# Patient Record
Sex: MALE | Race: BLACK OR AFRICAN AMERICAN | URBAN - NONMETROPOLITAN AREA
[De-identification: names, ages, dates, MRNs, and addresses within clinical notes are randomized per-mention and may not be internally consistent; named-entity substitution may affect disease eponyms.]

---

## 2017-09-12 ENCOUNTER — TELEPHONE (OUTPATIENT)
Dept: ADMINISTRATIVE | Age: 82
End: 2017-09-12

## 2017-11-03 ENCOUNTER — APPOINTMENT (OUTPATIENT)
Dept: CT IMAGING | Age: 82
DRG: 065 | End: 2017-11-03
Payer: MEDICARE

## 2017-11-03 ENCOUNTER — HOSPITAL ENCOUNTER (INPATIENT)
Age: 82
LOS: 2 days | Discharge: NURSING, REHAB OR LONG-TERM CARE | DRG: 065 | End: 2017-11-06
Attending: FAMILY MEDICINE | Admitting: INTERNAL MEDICINE
Payer: MEDICARE

## 2017-11-03 DIAGNOSIS — R47.01 APHASIA: Primary | ICD-10-CM

## 2017-11-03 LAB
ALBUMIN SERPL-MCNC: 3.4 G/DL (ref 3.5–5.1)
ALP BLD-CCNC: 83 U/L (ref 38–126)
ALT SERPL-CCNC: 10 U/L (ref 11–66)
ANION GAP SERPL CALCULATED.3IONS-SCNC: 10 MEQ/L (ref 8–16)
AST SERPL-CCNC: 13 U/L (ref 5–40)
BASOPHILS # BLD: 0.4 %
BASOPHILS ABSOLUTE: 0 THOU/MM3 (ref 0–0.1)
BILIRUB SERPL-MCNC: 0.5 MG/DL (ref 0.3–1.2)
BUN BLDV-MCNC: 29 MG/DL (ref 7–22)
CALCIUM SERPL-MCNC: 8.4 MG/DL (ref 8.5–10.5)
CHLORIDE BLD-SCNC: 103 MEQ/L (ref 98–111)
CO2: 26 MEQ/L (ref 23–33)
CREAT SERPL-MCNC: 1.6 MG/DL (ref 0.4–1.2)
EOSINOPHIL # BLD: 2 %
EOSINOPHILS ABSOLUTE: 0.1 THOU/MM3 (ref 0–0.4)
GFR SERPL CREATININE-BSD FRML MDRD: 49 ML/MIN/1.73M2
GLUCOSE BLD-MCNC: 106 MG/DL (ref 70–108)
GLUCOSE BLD-MCNC: 133 MG/DL (ref 70–108)
HCT VFR BLD CALC: 36 % (ref 42–52)
HEMOGLOBIN: 11.9 GM/DL (ref 14–18)
LACTIC ACID: 1.3 MMOL/L (ref 0.5–2.2)
LYMPHOCYTES # BLD: 36.6 %
LYMPHOCYTES ABSOLUTE: 2.6 THOU/MM3 (ref 1–4.8)
MCH RBC QN AUTO: 32 PG (ref 27–31)
MCHC RBC AUTO-ENTMCNC: 33.1 GM/DL (ref 33–37)
MCV RBC AUTO: 96.8 FL (ref 80–94)
MONOCYTES # BLD: 8.1 %
MONOCYTES ABSOLUTE: 0.6 THOU/MM3 (ref 0.4–1.3)
NUCLEATED RED BLOOD CELLS: 0 /100 WBC
OSMOLALITY CALCULATION: 285.3 MOSMOL/KG (ref 275–300)
PDW BLD-RTO: 13.5 % (ref 11.5–14.5)
PLATELET # BLD: 119 THOU/MM3 (ref 130–400)
PMV BLD AUTO: 8.9 MCM (ref 7.4–10.4)
POTASSIUM SERPL-SCNC: 4.9 MEQ/L (ref 3.5–5.2)
RBC # BLD: 3.72 MILL/MM3 (ref 4.7–6.1)
SEG NEUTROPHILS: 52.9 %
SEGMENTED NEUTROPHILS ABSOLUTE COUNT: 3.7 THOU/MM3 (ref 1.8–7.7)
SODIUM BLD-SCNC: 139 MEQ/L (ref 135–145)
TOTAL PROTEIN: 6.7 G/DL (ref 6.1–8)
TROPONIN T: 0.03 NG/ML
WBC # BLD: 7 THOU/MM3 (ref 4.8–10.8)

## 2017-11-03 PROCEDURE — 70496 CT ANGIOGRAPHY HEAD: CPT

## 2017-11-03 PROCEDURE — 84484 ASSAY OF TROPONIN QUANT: CPT

## 2017-11-03 PROCEDURE — 93005 ELECTROCARDIOGRAM TRACING: CPT

## 2017-11-03 PROCEDURE — B32G1ZZ COMPUTERIZED TOMOGRAPHY (CT SCAN) OF BILATERAL VERTEBRAL ARTERIES USING LOW OSMOLAR CONTRAST: ICD-10-PCS | Performed by: RADIOLOGY

## 2017-11-03 PROCEDURE — 70498 CT ANGIOGRAPHY NECK: CPT

## 2017-11-03 PROCEDURE — 83605 ASSAY OF LACTIC ACID: CPT

## 2017-11-03 PROCEDURE — B32R1ZZ COMPUTERIZED TOMOGRAPHY (CT SCAN) OF INTRACRANIAL ARTERIES USING LOW OSMOLAR CONTRAST: ICD-10-PCS | Performed by: RADIOLOGY

## 2017-11-03 PROCEDURE — 80053 COMPREHEN METABOLIC PANEL: CPT

## 2017-11-03 PROCEDURE — 99285 EMERGENCY DEPT VISIT HI MDM: CPT

## 2017-11-03 PROCEDURE — 82948 REAGENT STRIP/BLOOD GLUCOSE: CPT

## 2017-11-03 PROCEDURE — B3251ZZ COMPUTERIZED TOMOGRAPHY (CT SCAN) OF BILATERAL COMMON CAROTID ARTERIES USING LOW OSMOLAR CONTRAST: ICD-10-PCS | Performed by: RADIOLOGY

## 2017-11-03 PROCEDURE — 6370000000 HC RX 637 (ALT 250 FOR IP): Performed by: INTERNAL MEDICINE

## 2017-11-03 PROCEDURE — 36415 COLL VENOUS BLD VENIPUNCTURE: CPT

## 2017-11-03 PROCEDURE — 6360000004 HC RX CONTRAST MEDICATION: Performed by: INTERNAL MEDICINE

## 2017-11-03 PROCEDURE — 70450 CT HEAD/BRAIN W/O DYE: CPT

## 2017-11-03 PROCEDURE — B3281ZZ COMPUTERIZED TOMOGRAPHY (CT SCAN) OF BILATERAL INTERNAL CAROTID ARTERIES USING LOW OSMOLAR CONTRAST: ICD-10-PCS | Performed by: RADIOLOGY

## 2017-11-03 PROCEDURE — 85025 COMPLETE CBC W/AUTO DIFF WBC: CPT

## 2017-11-03 RX ORDER — M-VIT,TX,IRON,MINS/CALC/FOLIC 27MG-0.4MG
1 TABLET ORAL DAILY
COMMUNITY

## 2017-11-03 RX ORDER — ASPIRIN 81 MG/1
81 TABLET, CHEWABLE ORAL ONCE
Status: COMPLETED | OUTPATIENT
Start: 2017-11-03 | End: 2017-11-03

## 2017-11-03 RX ADMIN — ASPIRIN 81 MG 81 MG: 81 TABLET ORAL at 21:45

## 2017-11-03 RX ADMIN — IOPAMIDOL 80 ML: 755 INJECTION, SOLUTION INTRAVENOUS at 20:27

## 2017-11-03 ASSESSMENT — ENCOUNTER SYMPTOMS
NAUSEA: 0
RHINORRHEA: 0
DIARRHEA: 0
WHEEZING: 0
SORE THROAT: 0
EYE REDNESS: 0
COUGH: 0
BACK PAIN: 0
VOMITING: 0
ABDOMINAL PAIN: 0
SHORTNESS OF BREATH: 0
EYE DISCHARGE: 0

## 2017-11-03 NOTE — ED PROVIDER NOTES
[facility]  eMERGENCY dEPARTMENT eNCOUnter          CHIEF COMPLAINT       Chief Complaint   Patient presents with    Aphasia       Nurses Notes reviewed and I agree except as noted in the HPI. HISTORY OF PRESENT ILLNESS    Elena Lucas is a 80 y.o. male who presents to the Emergency Department via EMS for the evaluation of aphasia. The nursing home states that the patient was his normally talkative self all day until 7PM. They states that the patient stopped talking and had some left side facial droop. The patient has medical history of dementia and and hypertension. Unable to fully access the patient due to the acuity of his condition and limited history. REVIEW OF SYSTEMS     Review of Systems   Constitutional: Negative for appetite change, chills, fatigue and fever. HENT: Negative for congestion, ear pain, rhinorrhea and sore throat. Eyes: Negative for discharge, redness and visual disturbance. Respiratory: Negative for cough, shortness of breath and wheezing. Cardiovascular: Negative for chest pain, palpitations and leg swelling. Gastrointestinal: Negative for abdominal pain, diarrhea, nausea and vomiting. Genitourinary: Negative for decreased urine volume, difficulty urinating and dysuria. Musculoskeletal: Negative for arthralgias, back pain, joint swelling and neck pain. Skin: Negative for pallor and rash. Allergic/Immunologic: Negative for environmental allergies. Neurological: Positive for facial asymmetry (left side facial droop ) and speech difficulty (asphagia ). Negative for dizziness, syncope, weakness, light-headedness and headaches. Hematological: Negative for adenopathy. Psychiatric/Behavioral: Negative for agitation, confusion, dysphoric mood and suicidal ideas. The patient is not nervous/anxious. PAST MEDICAL HISTORY    has a past medical history of Abnormal posture; Alzheimer disease;  Atherosclerotic heart disease; Benign prostatic hyperplasia; Contracture of elbow; Contracture of hand joint; Contracture of shoulder; Dementia; Dementia; Diabetes mellitus ([facility] [address] Dysphagia; Falling; Hydrocephalus; Hypertension; Lack of coordination; and Muscle weakness. SURGICAL HISTORY      has no past surgical history on file. CURRENT MEDICATIONS       Previous Medications    ACETAMINOPHEN (TYLENOL) 325 MG TABLET    Take 650 mg by mouth every 4 hours as needed for Pain    AMOXICILLIN-CLAVULANATE (AUGMENTIN) 875-125 MG PER TABLET    Take 1 tablet by mouth 2 times daily For 10 days    BABY ASPIRIN PO    Take 81 mg by mouth daily. DONEPEZIL (ARICEPT) 10 MG TABLET      Take 10 mg by mouth daily     ERYTHROMYCIN BASE (E-MYCIN) 250 MG TABLET    Take 250 mg by mouth daily For 4 days    MEMANTINE (NAMENDA XR) 28 MG CP24 CAPSULE    Take 28 mg by mouth daily    NITROGLYCERIN (NITROSTAT) 0.4 MG SL TABLET    Place 0.4 mg under the tongue every 5 minutes as needed for Chest pain Up to 3 doses    TAMSULOSIN (FLOMAX) 0.4 MG CAPSULE    Take 0.4 mg by mouth daily       ALLERGIES     has No Known Allergies. FAMILY HISTORY     has no family status information on file. family history is not on file. SOCIAL HISTORY      reports that he has quit smoking. His smoking use included Cigarettes. He does not have any smokeless tobacco history on file. He reports that he does not drink alcohol or use drugs. PHYSICAL EXAM     INITIAL VITALS:  blood pressure is 160/72 (abnormal) and his pulse is 62. His respiration is 16 and oxygen saturation is 100%. Physical Exam   Constitutional: He is oriented to person, place, and time. He appears well-developed and well-nourished. The patient understands some directions and commands, unable to talk. HENT:   Head: Normocephalic and atraumatic. The patient has a left side facial droop. Eyes: Conjunctivae are normal. Right eye exhibits no discharge. Left eye exhibits no discharge. No scleral icterus.    The patient has pin point pupils are bilaterally. Neck: Normal range of motion. Neck supple. No JVD present. Cardiovascular: Normal rate and normal heart sounds. Pulmonary/Chest: Effort normal. No stridor. No respiratory distress. Abdominal: Soft. He exhibits no distension. Musculoskeletal: Normal range of motion. He exhibits no edema. The patient has bilateral contracture to upper extremities. Neurological: He is alert and oriented to person, place, and time. He exhibits normal muscle tone. GCS eye subscore is 4. GCS verbal subscore is 5. GCS motor subscore is 6. Skin: Skin is warm and dry. He is not diaphoretic. No erythema. Psychiatric: He has a normal mood and affect. His behavior is normal.   Nursing note and vitals reviewed. DIAGNOSTIC RESULTS     EKG: All EKG's are interpreted by the Emergency Department Physician who either signs or Co-signs this chart in the absence of a cardiologist.  EKG interpreted by Sin Jaramillo MD:    Vent. Rate: 66 bpm  IL interval: 192 ms  QRS duration: 86 ms  QTc: 444 ms  P-R-T axes: 55, -32, -23  Sinus rhythm with occasional premature ventricular complexes  Left axis deviation  Voltage criteria for left ventricular hypertrophy  Inferior infarct, age undetermined  No STEMI    RADIOLOGY: non-plain film images(s) such as CT, Ultrasound and MRI are read by the radiologist.    CTA Head W WO Contrast   Final Result      1. 75 % stenosis at the origin of the right internal carotid artery. 2. No stenosis of the left carotid system. 3. Mild intracranial atherosclerosis without severe stenoses. 4. Severe stenosis at the origin of the right vertebral artery. There is moderate stenosis of the origin of the left vertebral artery. There is moderate stenosis of both vertebral arteries as they enter the dural ring. **This report has been created using voice recognition software. It may contain minor errors which are inherent in voice recognition technology. ** Final report electronically signed by Dr. Nile Ramirez on 11/3/2017 8:59 PM      CTA NECK W WO CONTRAST   Final Result      1. 75 % stenosis at the origin of the right internal carotid artery. 2. No stenosis of the left carotid system. 3. Mild intracranial atherosclerosis without severe stenoses. 4. Severe stenosis at the origin of the right vertebral artery. There is moderate stenosis of the origin of the left vertebral artery. There is moderate stenosis of both vertebral arteries as they enter the dural ring. **This report has been created using voice recognition software. It may contain minor errors which are inherent in voice recognition technology. **      Final report electronically signed by Dr. Nile Ramirez on 11/3/2017 8:59 PM      CT HEAD WO CONTRAST   Final Result       1. Moderate-severe chronic small vessel ischemic changes. 2. No acute findings. **This report has been created using voice recognition software. It may contain minor errors which are inherent in voice recognition technology. **      Final report electronically signed by Dr. Nile Ramirez on 11/3/2017 8:01 PM          LABS:   Labs Reviewed   CBC WITH AUTO DIFFERENTIAL - Abnormal; Notable for the following:        Result Value    RBC 3.72 (*)     Hemoglobin 11.9 (*)     Hematocrit 36.0 (*)     MCV 96.8 (*)     MCH 32.0 (*)     Platelets 562 (*)     All other components within normal limits   URINE CULTURE   COMPREHENSIVE METABOLIC PANEL   LACTIC ACID, PLASMA   TROPONIN   URINALYSIS   POCT GLUCOSE       EMERGENCY DEPARTMENT COURSE:   Vitals:    Vitals:    11/03/17 1958 11/03/17 2058   BP: (!) 132/94 (!) 160/72   Pulse: 73 62   Resp: 16 16   SpO2:  100%       7:53 PM: The patient was seen and evaluated. For stroke     MDM:  Patient had a CT scan done which was negative for any bleed.   Patient CT of head and neck was done which did showed stenosis of right internal carotid artery and severe stenosis of the origin of the right vertebral artery. During his stay in the emergency department and patient started talking. According to Dr. Toby Cheng patient was not a candidate for TPA based on his CTA. Patient will be admitted by Dr. Ilan Petty for further workup and management. On recommendation of the neurologist patient will be started on baby aspirin. CRITICAL CARE:   Due to the immediate potential for life-threatening deterioration due to aphasia , I spent 35 minutes providing critical care. This time is excluding time spent performing procedures. CONSULTS:  Dr. Toby Cheng MD instructed me to wait before proceeding with any further treatment until the CTA returrns  Dr. Rose Harm:  None     FINAL IMPRESSION      1. Aphasia          DISPOSITION/PLAN   Admit    PATIENT REFERRED TO:  No follow-up provider specified. DISCHARGE MEDICATIONS:  New Prescriptions    No medications on file       (Please note that portions of this note were completed with a voice recognition program.  Efforts were made to edit the dictations but occasionally words are mis-transcribed.)    Scribe:  Landon Khan 11/3/17 7:53 PM Scribing for and in the presence of Ren Cabral MD.    Signed by: Landon Khan, [id number] [place] [address], 11/03/17 9:22 PM    Provider:  I personally performed the services described in the documentation, reviewed and edited the documentation which was dictated to the scribe in my presence, and it accurately records my words and actions.     Ren Cabral MD 11/3/17 9:22 PM                          Ren Cabral MD  11/03/17 9726       Ren Cabral MD  11/03/17 0432       Ren Cabral MD  11/03/17 4501

## 2017-11-04 PROBLEM — I72.6 VERTEBRAL ARTERY ANEURYSM (HCC): Status: ACTIVE | Noted: 2017-11-04

## 2017-11-04 PROBLEM — R47.01 RECEPTIVE APHASIA: Status: ACTIVE | Noted: 2017-11-04

## 2017-11-04 PROBLEM — I65.21 STENOSIS OF RIGHT CAROTID ARTERY: Status: ACTIVE | Noted: 2017-11-04

## 2017-11-04 LAB
CHOLESTEROL, TOTAL: 154 MG/DL (ref 100–199)
EKG ATRIAL RATE: 66 BPM
EKG P AXIS: 55 DEGREES
EKG P-R INTERVAL: 192 MS
EKG Q-T INTERVAL: 424 MS
EKG QRS DURATION: 86 MS
EKG QTC CALCULATION (BAZETT): 444 MS
EKG R AXIS: -32 DEGREES
EKG T AXIS: -23 DEGREES
EKG VENTRICULAR RATE: 66 BPM
HDLC SERPL-MCNC: 46 MG/DL
LDL CHOLESTEROL CALCULATED: 91 MG/DL
MRSA SCREEN RT-PCR: NEGATIVE
TRIGL SERPL-MCNC: 83 MG/DL (ref 0–199)

## 2017-11-04 PROCEDURE — 97166 OT EVAL MOD COMPLEX 45 MIN: CPT

## 2017-11-04 PROCEDURE — 1210000002 HC MED SURG R&B - NEUROSCIENCE

## 2017-11-04 PROCEDURE — A4421 OSTOMY SUPPLY MISC: HCPCS

## 2017-11-04 PROCEDURE — 92610 EVALUATE SWALLOWING FUNCTION: CPT

## 2017-11-04 PROCEDURE — 87147 CULTURE TYPE IMMUNOLOGIC: CPT

## 2017-11-04 PROCEDURE — G8987 SELF CARE CURRENT STATUS: HCPCS

## 2017-11-04 PROCEDURE — 87081 CULTURE SCREEN ONLY: CPT

## 2017-11-04 PROCEDURE — 36415 COLL VENOUS BLD VENIPUNCTURE: CPT

## 2017-11-04 PROCEDURE — 80061 LIPID PANEL: CPT

## 2017-11-04 PROCEDURE — 87641 MR-STAPH DNA AMP PROBE: CPT

## 2017-11-04 PROCEDURE — G8988 SELF CARE GOAL STATUS: HCPCS

## 2017-11-04 PROCEDURE — 2580000003 HC RX 258: Performed by: INTERNAL MEDICINE

## 2017-11-04 PROCEDURE — 99223 1ST HOSP IP/OBS HIGH 75: CPT | Performed by: INTERNAL MEDICINE

## 2017-11-04 RX ORDER — LOPERAMIDE HYDROCHLORIDE 2 MG/1
2 CAPSULE ORAL PRN
COMMUNITY

## 2017-11-04 RX ORDER — NITROGLYCERIN 0.4 MG/1
0.4 TABLET SUBLINGUAL EVERY 5 MIN PRN
Status: DISCONTINUED | OUTPATIENT
Start: 2017-11-04 | End: 2017-11-06 | Stop reason: HOSPADM

## 2017-11-04 RX ORDER — M-VIT,TX,IRON,MINS/CALC/FOLIC 27MG-0.4MG
1 TABLET ORAL DAILY
Status: DISCONTINUED | OUTPATIENT
Start: 2017-11-04 | End: 2017-11-06 | Stop reason: HOSPADM

## 2017-11-04 RX ORDER — ONDANSETRON 2 MG/ML
4 INJECTION INTRAMUSCULAR; INTRAVENOUS EVERY 6 HOURS PRN
Status: DISCONTINUED | OUTPATIENT
Start: 2017-11-04 | End: 2017-11-06 | Stop reason: HOSPADM

## 2017-11-04 RX ORDER — IPRATROPIUM BROMIDE AND ALBUTEROL SULFATE 2.5; .5 MG/3ML; MG/3ML
3 SOLUTION RESPIRATORY (INHALATION) EVERY 4 HOURS PRN
COMMUNITY

## 2017-11-04 RX ORDER — TAMSULOSIN HYDROCHLORIDE 0.4 MG/1
0.4 CAPSULE ORAL DAILY
Status: DISCONTINUED | OUTPATIENT
Start: 2017-11-04 | End: 2017-11-06 | Stop reason: HOSPADM

## 2017-11-04 RX ORDER — MORPHINE SULFATE 4 MG/ML
4 INJECTION, SOLUTION INTRAMUSCULAR; INTRAVENOUS
Status: DISCONTINUED | OUTPATIENT
Start: 2017-11-04 | End: 2017-11-04 | Stop reason: DRUGHIGH

## 2017-11-04 RX ORDER — MORPHINE SULFATE 2 MG/ML
1 INJECTION, SOLUTION INTRAMUSCULAR; INTRAVENOUS
Status: DISCONTINUED | OUTPATIENT
Start: 2017-11-04 | End: 2017-11-06 | Stop reason: HOSPADM

## 2017-11-04 RX ORDER — MEMANTINE HYDROCHLORIDE 10 MG/1
10 TABLET ORAL 2 TIMES DAILY
Status: DISCONTINUED | OUTPATIENT
Start: 2017-11-04 | End: 2017-11-06 | Stop reason: HOSPADM

## 2017-11-04 RX ORDER — MEMANTINE HYDROCHLORIDE 28 MG/1
28 CAPSULE, EXTENDED RELEASE ORAL DAILY
Status: DISCONTINUED | OUTPATIENT
Start: 2017-11-04 | End: 2017-11-04 | Stop reason: CLARIF

## 2017-11-04 RX ORDER — DONEPEZIL HYDROCHLORIDE 10 MG/1
10 TABLET, FILM COATED ORAL DAILY
Status: DISCONTINUED | OUTPATIENT
Start: 2017-11-04 | End: 2017-11-06 | Stop reason: HOSPADM

## 2017-11-04 RX ORDER — ACETAMINOPHEN 325 MG/1
650 TABLET ORAL EVERY 4 HOURS PRN
Status: DISCONTINUED | OUTPATIENT
Start: 2017-11-04 | End: 2017-11-06 | Stop reason: HOSPADM

## 2017-11-04 RX ORDER — SODIUM CHLORIDE 9 MG/ML
INJECTION, SOLUTION INTRAVENOUS CONTINUOUS
Status: DISCONTINUED | OUTPATIENT
Start: 2017-11-04 | End: 2017-11-06 | Stop reason: HOSPADM

## 2017-11-04 RX ADMIN — SODIUM CHLORIDE: 9 INJECTION, SOLUTION INTRAVENOUS at 07:15

## 2017-11-04 ASSESSMENT — PAIN SCALES - WONG BAKER: WONGBAKER_NUMERICALRESPONSE: 0

## 2017-11-04 NOTE — FLOWSHEET NOTE
Dr Jenny Velarde called to modify patients code status and to order therapies, labs and MRSA/VRE cultures

## 2017-11-04 NOTE — PROGRESS NOTES
[facility] NEUROSCIENCES 4A  Bedside Swallowing Evaluation      SLP Individual Minutes  Time In: 3327  Time Out: 1325  Minutes: 11  Timed Code Treatment Minutes: 0 Minutes       Date: 2017  Patient Name: Neeta Monroe      CSN: [id number]   : 1925  (80 y.o.)  Gender: male   Referring Physician:  Dr. Orlando Delgado  Diagnosis: Carotic Stenosis Right  Secondary Diagnosis:  Dysphagia   History of Present Illness/Injury: Patient admitted to [facility] with above dx. See physician H&P for full report. ST consulted d/t swallowing concerns reported by RN Kacey Rojas. ST to complete BSE, determine safety of PO diet and determine least restrictive diet, and further develop POC.    Past Medical History:   Diagnosis Date    Abnormal posture     Alzheimer disease     Atherosclerotic heart disease     Benign prostatic hyperplasia     Contracture of elbow     right and left    Contracture of hand joint     right and left    Contracture of shoulder     right and left    Dementia     Dementia     Diabetes mellitus (HCC)     Dysphagia     Falling     Hydrocephalus     normal pressure    Hypertension     Lack of coordination     Muscle weakness     Other acute kidney failure (CODE) (HCC)     BPH       Pain:  0/10    Current Diet: NPO     Respiratory Status: [] Independent [x] Nasal Cannula [] Oxygen Mask      [] Tracheostomy [] Other:     [] Ventilator/Settings:    Behavioral Observation: [x] Alert [] Oriented [] Confused [x] Lethargic      [] Dysarthric [x] Limited Direction Following [] Agitated      [] Other:    ORAL MECHANISM EVALUATION:         Comments:  Facial / Labial []WFL [x] Impaired []DNT Decreased ROM and weakness   Lingual []WFL [x] Impaired []DNT Decreased ROM and weakness   Dentition []WFL [x] Impaired []DNT Edentulous   Velum []WFL [] Impaired [x]DNT    Vocal Quality [x]WFL [] Impaired []DNT    Sensation [x]WFL [] Impaired []DNT    Cough []WFL [] Impaired [x]DNT    Other: []WFL [] Impaired []DNT    Other: []WFL [] Impaired []DNT        PATIENT WAS EVALUATED USING:  Thin, puree, hard solids     ORAL PHASE: [] WFL  [x] Impaired   [] Loss of bolus from lips [x] Impaired AP movement [] Pocketing Left   [] Pocketing Right  [] Lingual Pumping  [] Impaired Mastication   [x] Reduced Bolus Formation [x] Impaired Oral Initiation    [] OTHER:    PHARYNGEAL PHASE: [x] WFL: Pharyngeal phase appears WFLs, but cannot completely rule out pharyngeal phase deficits from a bedside swallow evaluation alone. [] Impaired   [] Delayed Swallow  [] Decreased Hyolaryngeal Elevation [] Audible Swallow   [] Suspected Pharyngeal Residue due to spontaneous multiple swallow. [] OTHER:    SIGNS AND SYMPTOMS OF LARYNGEAL PENETRATION / ASPIRATION:  [x] NO sign/symptoms of aspiration evident with this evaluation, but cannot rule out silent aspiration. [] Throat Clear  [] Immediate Cough [] Delayed Cough [] Wet Vocal Quality  [] Change in Pulmonary Status  [] OTHER:    IMPRESSIONS: Patient presents with moderate oral dysphagia as evidence by the findings outlined above. Patient with limited lingual/labial ROM and overall increased fatigue. Patient completed evaluation with eyes closed; however, patient remained engaged throughout PO trials. Patient completed trial of thin liquids via straw x10 and puree x4, no s/s of aspiration. Patient demonstrated with mildly impaired; however, functional bolus formation and control, timely A-P transit, timely swallow, effective oral clearance, no s/s of aspiration with puree and thin liquids. Patient is edentulous, advanced textures not evaluate at this time d/t poor dentition and poor alertness. Pharyngeal system presents to be [facility]. Cannot fully r/o aspiration at bedside alone. Recommend dysphagia I with thin liquids, upright position, close pulmonary monitoring and direct 1:1 feeding assistance.   Will plan to complete ST evaluation d/t intermittent confusion and decreased ability to follow 2+ step commands. RECOMMENDATIONS:     Modified Barium Swallow: [] Is indicated to further assess    [x] Is NOT indicated at this time; Will recommend as        appropriate. DIET RECOMMENDATIONS:  Dysphagia I with thin liquids     STRATEGIES: [] Strategies pending MBS results. [x] Full upright position  [x] Small bite/sip [] No Straw [] Multiple Swallow  [] Chin tuck [] Head turn [x] Pulmonary monitoring [] Oral care after all meals  [] Supervision  [] Medication in applesauce [x]Direct 1:1 Supervision/Assistance   [] Spoon all liquids [] Alternate solid / liquid [] Limit distractions [x] Monitor for fatigue  [] PMV in place for all po [x] OTHER: PO intake ONLY when alert       EDUCATION:   Learner: [x]Patient [] Significant other [] Son/Daughter [] Parent     [] Other:   Education: [x] Reviewed results and recommendations of this evaluation     [x] Reviewed diet and strategies     [x] Reviewed signs, symptoms and risk of aspiration     [] Demonstrated how to thick liquid appropriately. [x] Reviewed goals and Plan of Care     [] OTHER:   Method: [x] Discussion [x] Demonstration [] Hand-out     [] OTHER:   Evaluation of Education:     [x] Verbalizes understanding [x] Demonstrates with assistance     [] Demonstrates without assistance [x]Needs further instruction     [] No evidence of learning  [x] Family not present    PATIENT GOALS: [x] Pt did not state. Will further assess during treatment. [] Return to the least restricted diet possible     [] Return to previous level of function     [] OTHER:    PLAN / TREATMENT RECOMMENDATIONS:  [x] Speech Therapy evaluation to assess speech, language, cognition and/or voice  [x] Skilled SLP intervention on acute care 3-5 x per week or until goals met and/or pt plateaus in function.   Specific interventions for next session may include: dysphagia     SHORT TERM GOALS:  Short-term Goals  Timeframe for Short-term Goals: 2 weeks   Goal

## 2017-11-04 NOTE — PROGRESS NOTES
Hospitalist Progress Note    Patient:  Karen Wynn Person      Unit/Bed:4A-16/016-A    YOB: 1925    MRN: [id number]       Acct: [de-identified]     PCP: Jose Wheeler MD    Date of Admission: 11/3/2017        Subjective:     Pt is awake, able to talk but appears confused,had to say if he has aphasia or is simply confused. He is able to follow commands    Medications:  Reviewed    Infusion Medications    sodium chloride 75 mL/hr at 11/04/17 0715     Scheduled Medications    donepezil  10 mg Oral Daily    tamsulosin  0.4 mg Oral Daily    therapeutic multivitamin-minerals  1 tablet Oral Daily    [START ON 11/5/2017] influenza virus vaccine  0.5 mL Intramuscular Once    [START ON 11/5/2017] pneumococcal 13-valent conjugate  0.5 mL Intramuscular Once    memantine  10 mg Oral BID     PRN Meds: acetaminophen, nitroGLYCERIN, ondansetron, morphine **OR** [DISCONTINUED] morphine    No intake or output data in the 24 hours ending 11/04/17 1300    Diet:       Exam:  BP (!) 145/78   Pulse 63   Temp 97.8 °F (36.6 °C) (Axillary)   Resp 14   Ht 5' 8\" (1.727 m)   Wt 182 lb 3.2 oz (82.6 kg)   SpO2 97%   BMI 27.70 kg/m²     General appearance: No apparent distress, appears stated age and cooperative. HEENT: Pupils equal, round, and reactive to light. Conjunctivae/corneas clear. Neck: Supple, with full range of motion. No jugular venous distention. Trachea midline. Respiratory:  Normal respiratory effort. Clear to auscultation, bilaterally without Rales/Wheezes/Rhonchi. Cardiovascular: Regular rate and rhythm with normal S1/S2 without murmurs, rubs or gallops. Abdomen: Soft, non-tender, non-distended with normal bowel sounds. Musculoskeletal: No clubbing, cyanosis or edema bilaterally. Contractures of the arms  Skin: Skin color, texture, turgor normal.  No rashes or lesions. Neurologic:  Neurovascularly intact without any focal sensory/motor deficits.  Cranial nerves: II-XII intact, grossly non-focal.  Capillary Refill: Brisk,< 3 seconds   Peripheral Pulses: +2 palpable, equal bilaterally       Labs:   Recent Labs      11/03/17 2049   WBC  7.0   HGB  11.9*   HCT  36.0*   PLT  119*     Recent Labs      11/03/17 2049   NA  139   K  4.9   CL  103   CO2  26   BUN  29*   CREATININE  1.6*   CALCIUM  8.4*     Recent Labs      11/03/17 2049   AST  13   ALT  10*   BILITOT  0.5   ALKPHOS  83     No results for input(s): INR in the last 72 hours. No results for input(s): Lexie Lizet in the last 72 hours. Urinalysis:      Lab Results   Component Value Date    NITRU NEGATIVE 12/12/2011    BACTERIA NONE 12/12/2011    BLOODU SMALL 12/12/2011    SPECGRAV 1.028 12/12/2011       Radiology:  CTA Head W WO Contrast   Final Result      1. 75 % stenosis at the origin of the right internal carotid artery. 2. No stenosis of the left carotid system. 3. Mild intracranial atherosclerosis without severe stenoses. 4. Severe stenosis at the origin of the right vertebral artery. There is moderate stenosis of the origin of the left vertebral artery. There is moderate stenosis of both vertebral arteries as they enter the dural ring. **This report has been created using voice recognition software. It may contain minor errors which are inherent in voice recognition technology. **      Final report electronically signed by Dr. Morenita Bee on 11/3/2017 8:59 PM      CTA NECK W WO CONTRAST   Final Result      1. 75 % stenosis at the origin of the right internal carotid artery. 2. No stenosis of the left carotid system. 3. Mild intracranial atherosclerosis without severe stenoses. 4. Severe stenosis at the origin of the right vertebral artery. There is moderate stenosis of the origin of the left vertebral artery. There is moderate stenosis of both vertebral arteries as they enter the dural ring. **This report has been created using voice recognition software.  It may contain minor errors which are inherent in voice recognition technology. **      Final report electronically signed by Dr. Bob Polanco on 11/3/2017 8:59 PM      CT HEAD WO CONTRAST   Final Result       1. Moderate-severe chronic small vessel ischemic changes. 2. No acute findings. **This report has been created using voice recognition software. It may contain minor errors which are inherent in voice recognition technology. **      Final report electronically signed by Dr. Bob Polanco on 11/3/2017 8:01 PM          Diet:      DVT prophylaxis: [x] Lovenox                                 [] SCDs                                 [] SQ Heparin                                 [] Encourage ambulation           [] Already on Anticoagulation     Disposition:    [x] Home       [] TCU       [] Rehab       [] Psych       [] SNF       [] [place]       [] Other-    Code Status: DNR-CCA    PT/OT Eval Status: ordered    Assessment/Plan:    Anticipated Discharge in : 3 days      1. Suspected acute CVA with ? Aphasia--,possible acute encephalopathy  2. 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 Dementia with behavioral disturbance- on namenda/aricept  5. dysphagia  6. Essential HTN-controlled  7. Contractures of the upper extremity/weakness-chronic  8. Mild troponin elevation in setting of CKD stage 3-- no furthur w/u--c/w medical mgt  9. CKD stage 3-stable  10.  Chronic anemia,likely related to CKD-mild -follow up outpatient      Plan    -Pt is not an appropriate candidate for JASPER stenosis surgery given advanced dementia/age.  -medical mgt for suspected acute stroke c/w -antiplatelets,statin  -Pt was not a  Candidate for tPA on presentation- not given  -Swallow eval  -PT/OT/ST consults  -Neurology consulted  -Will defer any furthur stroke w/u/mgt to neurology    Electronically signed by Timur He MD on 11/4/2017 at 1:00 PM

## 2017-11-04 NOTE — H&P
[address] [id number]                             HISTORY AND PHYSICAL    PATIENT NAME: Stefania Moise                        :         1925  MED REC NO:   [id number]                           ROOM:       0016  ACCOUNT NO:   [de-identified]                           ADMIT DATE:  2017  PROVIDER:     Neelima Catalan MD, [facility]      IDENTIFICATION:  A -BAGV-SWW white male. CHIEF COMPLAINT:  Coming from home with expressive aphasia. HISTORY OF PRESENT ILLNESS:  No available history. The family states  the patient was found to have expressive aphasia suddenly. The  patient has Alzheimer's dementia, contractures, diabetes,  hydrocephalus, lack of coordination, and muscle weakness. It is  becomes difficult to know if these symptoms are real or attached to  the old disease. When he came here he was not speaking much, could not  express himself. Of course, he cannot focus much because of dementia. His answers are in monosyllables. Blood pressure was 132/94, pulse 73  per minute, respirations 16, temperature was 97.8. Pulse oximetry  100% on 4 liters. Was taken to CAT scan, reviewed. It was unclear  how long he had had the expressive dysphasia or if it was any new  weakness in his arms or legs or if there was trouble swallowing or  dysphagia. PAST MEDICAL HISTORY:  Dementia, diabetes, contractures of most of his  joints, atherosclerosis, abnormal posture, falling, hypertension, lack  of coordination. PAST SURGICAL HISTORY:  Nothing significant. ALLERGIES: None. CODE STATUS:  Full code. SOCIAL HISTORY:  Does not smoke or drink. MEDICATIONS:  Multivitamin one daily, Flomax 0.4 mg daily, Namenda 28  mg daily, Aricept 10 mg daily, acetaminophen 650 mg every 4 hours,  nitroglycerin 0.4 b.i.d.     REVIEW OF SYSTEMS:  All 14 systems cannot be reviewed because the  patient does not answer questions very well, but generally I asked him  about chest pain, he said no; shortness of breath, no.  GI:  No abdominal pain. No diarrhea. URINARY SYSTEM:  No dysuria, frequency, or urgency. EYES:  No blurred vision. PHYSICAL EXAMINATION:  GENERAL:  On examination, he is conscious, alert, oriented only to  himself not in place and person or time. VITAL SIGNS:  Blood pressure was 132/94, 146/67; pulse 60 per minute;  respirations 16; temperature 97.8; pulse ox 100% on 1 liter of nasal  cannula oxygen. HEAD AND SCALP:  Atraumatic. EYES:  Pupils are actually pinpoint, 1 mm in size; reaction to light  cannot be discerned with this pinpoint pupils, but he can see. He  seems to follow eye commands with both eyes. Extraocular eye  movements are normal.  EARS:  External auditory canal, eardrums normal.  No perforation. NOSE:  Nasal cavity turbinates, maxillary, frontal, ethmoidal sinuses  nontender. LUNGS:  Chest wall palpation, percussion, auscultation is normal.   Vesicular breath sounds only. HEART:  Palpation, percussion, auscultation is normal.  First and  second heart sounds only heard. ABDOMEN:  Obese, soft, nontender. Bowel sounds normal.  Spleen, liver  and kidney not enlarged. PERIPHERAL VASCULAR SYSTEM:  Radial, ulnar, dorsalis pedis, posterior  tibial, femoral and carotid pulses normal.  SKIN:  No bruises, rashes, ecchymosis, or petechial hemorrhages. ENDOCRINE:  He has diabetes. No thyroid disease. MUSCULOSKELETAL SYSTEM:  Has contractures of elbows, shoulders, and  the hands especially. On examination, the contractures in the hands  limit extension of dorsiflexion or plantar flexion of the hands. Also  the elbows limit some flexion and extension. SKIN:  No bruises, rashes, ecchymosis. EXTREMITIES:  Lower extremities, straight leg raise. He was able to  raise his legs, both lower extremities, about 5 degrees off the bed  without any resistance, that makes it power 3 on both sides.    Dorsiflexion and plantar flexion 3/3. He was able to squeeze both  hands. Power is 3/3 both sides. Face, no facial paralysis. URINARY SYSTEM:   No suprapubic tenderness. No costovertebral angle  tenderness. PERIPHERAL VASCULAR SYSTEM:  Radial, ulnar, dorsalis  pedis, posterior tibial, femoral and carotid pulses normal.    LABORATORY DATA:  Sodium 139, potassium 4.9, chloride 103, CO2 of 26,  BUN 29, creatinine 1.6, down from 32 and 1.9 before, in May. Calcium  8.4, osmolality 285. Liver enzymes all normal.  White cell count 7.0,  hemoglobin 11.9, hematocrit 36, platelets 695, segments 52% or angina  and    DIAGNOSTIC DATA:  X-ray of the chest shows nonspecific alveolar  abnormalities of the left lung base. CTA of the neck shows 75%  stenosis of the right internal carotid artery. No stenosis of the  left side. Mild intracranial atherosclerosis without severe stenosis. Severe stenosis of the origin of the right vertebral artery, moderate  stenosis of the origin of the left vertebral artery, moderate stenosis  of both vertebral arteries as we enter the dural ring. 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 He does not have  any od the weakness or he may have recovered from them. He has  carotid stenosis significant enough to require surgery and  symptomatic. 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 His risk factors include age,  hypertension. The patient  has dementia. His perception of reality at this stage is very  difficult to know and his cooperation after surgery might be difficult  to discern. PLAN:  Consult Neurology. The indications for treatment of carotid  stenosis include symptomatic carotid stenosis above 70% with a  mortality with a stroke rate less than 3% and a stent can be down.    The stent has the advantage of having less vagus nerve

## 2017-11-04 NOTE — ED NOTES
Pt transported to [facility] by cart in stable condition. Pt monitored on telemetry. IV fluids running and IV is patent.     NS     [employer]  11/04/17 0241

## 2017-11-04 NOTE — ED NOTES
Son at bedside and friend. States he has been in the nursing home x 5 years. With bilateral arm contracture at least one year and hasnt walked in 2 years.       Ubaldo Simon RN  11/03/17 2052

## 2017-11-04 NOTE — ED NOTES
Pt to come to CT- for CTA per dr. Arlen Godfrey regardless of creatinine.  Pt transported to CT scan by this nurse     Mandy Parker RN  11/03/17 2023

## 2017-11-04 NOTE — ED TRIAGE NOTES
Pt to ED from [facility] for aphasia beginning 1900 with left sided facial droop. Pt normally speaks appropriately. Pt grunts and moans to questions. Pt alert. Pt unable to show teeth. Bilateral arm contractures that are not new. Pt does not walk either, which is not new.

## 2017-11-04 NOTE — PLAN OF CARE
Problem: Falls - Risk of  Goal: Absence of falls  Outcome: Ongoing  No falls at this time. Patient educated on call light and more frequent rounding completed. Bed alarm on at all times. Problem: Risk for Impaired Skin Integrity  Goal: Tissue integrity - skin and mucous membranes  Structural intactness and normal physiological function of skin and  mucous membranes. Outcome: Ongoing  Patient bedrest due to condition. Patient turned every two hours. No skin issues noted. Problem: NEUROLOGICAL DEFICIT  Goal: Neurological status is stable or improving  Outcome: Ongoing  Patient able to state his name. Patient unable to state the month, place or situation. Due to contractures and weakness, unable to determine if one side is weaker than the other. Pupils pinpoint and non reactive to light. Neuro checks completed every four hours. Problem: HEMODYNAMIC STATUS  Goal: Patient has stable vital signs and fluid balance  Outcome: Ongoing  Vital signs remain within normal limits this shift, at this time. Pt has IV fluids running at 75 mL/hr. All pulses present and palpable. Skin color pink, warm, dry. Capillary refill less then 3 seconds. Problem: ACTIVITY INTOLERANCE/IMPAIRED MOBILITY  Goal: Mobility/activity is maintained at optimum level for patient  Outcome: Ongoing  Patient bedrest due to condition. Patient turned every two hours and as needed. Problem: COMMUNICATION IMPAIRMENT  Goal: Ability to express needs and understand communication  Outcome: Ongoing  Patient has difficulty expressing thoughts and answering questions. Patient able to state name and that is all at this time. Will continue to assess. Problem: NUTRITION  Goal: Nutritional status is improving  Outcome: Ongoing  Patient NPO due to condition. Waiting for speech therapy to see.      Problem: ASPIRATION PRECAUTIONS  Goal: Patient's risk of aspiration is minimized  Outcome: Ongoing  Patient npo at this time due to being on a modified diet and having difficulty prior to admission with swallowing foods. Speech therapy ordered. HOB maintained at 30 degrees. Lungs diminished to ausculation. Problem: Discharge Planning:  Goal: Discharged to appropriate level of care  Discharged to appropriate level of care  Outcome: Ongoing  Discharge planning in process. Neurologist to see patient to determine plan. Problem: Pain:  Goal: Pain level will decrease  Pain level will decrease  Outcome: Ongoing  Patient educated on 1-10 pain scale. Patient unable to verbalize if he is in pain. Patient does not appear to be in pain at this time.      Comments: Patient unable to verbalize understanding of care plan at this time

## 2017-11-04 NOTE — PROGRESS NOTES
Pain:  Pain Assessment  Patient Currently in Pain: Denies  Pain Assessment: 0-10       Social/Functional:  Lives With: Other (comment)  Type of Home:  (ECF)          Receives Help From: Personal care attendant  ADL Assistance: Needs assistance          Ambulation Assistance: Needs assistance  Transfer Assistance: Needs assistance    Active : No       Objective        LUE PROM (degrees)  LUE PROM: Exceptions (<10% ROM, severe contractures present)               RUE PROM (degrees)  RUE PROM: Exceptions (<10% ROM, severe contractures present)       RUE Tone: Hypertonic  LUE Tone: Hypertonic    Movements Are Fluid And Coordinated: No  Coordination and Movement description: Fine motor impairments, Gross motor impairments, Right UE, Left UE               ADL  Grooming: Dependent/Total  UE Bathing: Dependent/Total   Therapist rolled up wash cloths and placed in palm of hands to prevent skin break down and further contractures. Type of ROM/Therapeutic Exercise  Type of ROM/Therapeutic Exercise: PROM  Comment: Pt. tolerated 10 reps of PROM at B elbow flexion/extension, open/close fists, pt presents with contractures and has reduced activity tolerance. Activity Tolerance:  Activity Tolerance: Patient Tolerated treatment well, Patient limited by fatigue    Treatment Initiated:  Nursing gave okay to see pt. Pt aggreable to OT session.      Assessment:  Assessment: Pt. limited in ADLs d/t contractures present in BUE  Performance deficits / Impairments: Decreased ADL status, Decreased ROM, Decreased strength, Decreased fine motor control, Decreased safe awareness, Decreased endurance  Prognosis: Fair  Discharge Recommendations: ECF with OT    Clinical Decision Making: Clinical Decision making was of Moderate Complexity as the result of analysis of data from a detailed assessment, a consideration of several treatment options, the presence of comorbidities affecting the plan of care and the need Modifier : CN

## 2017-11-05 LAB
ANION GAP SERPL CALCULATED.3IONS-SCNC: 14 MEQ/L (ref 8–16)
BASOPHILS # BLD: 0.5 %
BASOPHILS ABSOLUTE: 0 THOU/MM3 (ref 0–0.1)
BUN BLDV-MCNC: 22 MG/DL (ref 7–22)
CALCIUM SERPL-MCNC: 8 MG/DL (ref 8.5–10.5)
CHLORIDE BLD-SCNC: 106 MEQ/L (ref 98–111)
CO2: 21 MEQ/L (ref 23–33)
CREAT SERPL-MCNC: 1.3 MG/DL (ref 0.4–1.2)
EOSINOPHIL # BLD: 0.7 %
EOSINOPHILS ABSOLUTE: 0 THOU/MM3 (ref 0–0.4)
GFR SERPL CREATININE-BSD FRML MDRD: 62 ML/MIN/1.73M2
GLUCOSE BLD-MCNC: 84 MG/DL (ref 70–108)
HCT VFR BLD CALC: 34.7 % (ref 42–52)
HEMOGLOBIN: 11.9 GM/DL (ref 14–18)
LYMPHOCYTES # BLD: 28 %
LYMPHOCYTES ABSOLUTE: 1.5 THOU/MM3 (ref 1–4.8)
MCH RBC QN AUTO: 32.9 PG (ref 27–31)
MCHC RBC AUTO-ENTMCNC: 34.2 GM/DL (ref 33–37)
MCV RBC AUTO: 96.1 FL (ref 80–94)
MONOCYTES # BLD: 10.7 %
MONOCYTES ABSOLUTE: 0.6 THOU/MM3 (ref 0.4–1.3)
NUCLEATED RED BLOOD CELLS: 0 /100 WBC
PDW BLD-RTO: 13 % (ref 11.5–14.5)
PLATELET # BLD: 104 THOU/MM3 (ref 130–400)
PMV BLD AUTO: 8.7 MCM (ref 7.4–10.4)
POTASSIUM SERPL-SCNC: 4.1 MEQ/L (ref 3.5–5.2)
RBC # BLD: 3.61 MILL/MM3 (ref 4.7–6.1)
SEG NEUTROPHILS: 60.1 %
SEGMENTED NEUTROPHILS ABSOLUTE COUNT: 3.2 THOU/MM3 (ref 1.8–7.7)
SODIUM BLD-SCNC: 141 MEQ/L (ref 135–145)
WBC # BLD: 5.4 THOU/MM3 (ref 4.8–10.8)

## 2017-11-05 PROCEDURE — 36415 COLL VENOUS BLD VENIPUNCTURE: CPT

## 2017-11-05 PROCEDURE — 1210000002 HC MED SURG R&B - NEUROSCIENCE

## 2017-11-05 PROCEDURE — 85025 COMPLETE CBC W/AUTO DIFF WBC: CPT

## 2017-11-05 PROCEDURE — 80048 BASIC METABOLIC PNL TOTAL CA: CPT

## 2017-11-05 PROCEDURE — 99232 SBSQ HOSP IP/OBS MODERATE 35: CPT | Performed by: HOSPITALIST

## 2017-11-05 RX ORDER — ASPIRIN 81 MG/1
81 TABLET, CHEWABLE ORAL DAILY
Status: DISCONTINUED | OUTPATIENT
Start: 2017-11-05 | End: 2017-11-06 | Stop reason: HOSPADM

## 2017-11-05 RX ORDER — ATORVASTATIN CALCIUM 10 MG/1
10 TABLET, FILM COATED ORAL NIGHTLY
Status: DISCONTINUED | OUTPATIENT
Start: 2017-11-05 | End: 2017-11-06 | Stop reason: HOSPADM

## 2017-11-05 NOTE — PROGRESS NOTES
Attempted to contact both of patient's emergency contacts regarding MRI screening sheet. Voicemails left for each to call this RN.

## 2017-11-05 NOTE — PROGRESS NOTES
[address] [id number]  PHYSICAL THERAPY MISSED TREATMENT NOTE  ACUTE CARE  [facility]              Missed Treatment  Attempted to see pt this PM and MRI ordered for pt today. Plan to wait for results of MRI. Per RN pt was bed bound at [facility] the past 2 years. RN having difficulty getting a hold of family to approve MRI. Will plan to check back on pt on 11-6-7.     Jagdish Dickinson, DONALD [id number]

## 2017-11-05 NOTE — PLAN OF CARE
Problem: Falls - Risk of  Goal: Absence of falls  Outcome: Ongoing  Patient has been free from falls at this time. Problem: Risk for Impaired Skin Integrity  Goal: Tissue integrity - skin and mucous membranes  Structural intactness and normal physiological function of skin and  mucous membranes. Outcome: Ongoing  Patient's skin tear on scrotum reddened, clean. Barrier cream applied PRN. Problem: NEUROLOGICAL DEFICIT  Goal: Neurological status is stable or improving  Outcome: Ongoing  Patient alert and oriented to person. Problem: HEMODYNAMIC STATUS  Goal: Patient has stable vital signs and fluid balance  Outcome: Ongoing     11/05/17 1315   Vital Signs   Pulse 62   BP (!) 157/71       Problem: ACTIVITY INTOLERANCE/IMPAIRED MOBILITY  Goal: Mobility/activity is maintained at optimum level for patient  Outcome: Ongoing  Patient requires assistance with turns and repositioning. BLE and BUE contractured. Problem: COMMUNICATION IMPAIRMENT  Goal: Ability to express needs and understand communication  Outcome: Ongoing  Patient answers to yes and no questions at times but does not verbalize needs without being prompted. Follows few simple commands. Problem: NUTRITION  Goal: Nutritional status is improving  Outcome: Ongoing  Patient tolerating dysphagia I pureed diet at this time. Problem: ASPIRATION PRECAUTIONS  Goal: Patient's risk of aspiration is minimized  Outcome: Ongoing  No coughing, choking noted with intake at this time. Problem: Discharge Planning:  Goal: Discharged to appropriate level of care  Discharged to appropriate level of care   Outcome: Ongoing  Patient plans to return to [facility] when appropriate for discharge. Problem: Pain:  Goal: Pain level will decrease  Pain level will decrease   Outcome: Ongoing  Patient states, \"No\" when asked if he is having pain.      Problem: Nutrition  Goal: Optimal nutrition therapy  Outcome: Completed Date Met:

## 2017-11-05 NOTE — PROGRESS NOTES
RN called [facility] and spoke with RN who cares for Mr. Vivi Murray on a regular basis. RN stated patient will answer some questions and gets up to a wheelchair.  RN asked about contacting family in which RN stated, \"Yeah, they're very hard to get ahold of.\"

## 2017-11-05 NOTE — PROGRESS NOTES
rashes or lesions. Neurologic:  Neurovascularly intact without any focal sensory/motor deficits. Cranial nerves: II-XII intact, grossly non-focal.  Capillary Refill: Brisk,< 3 seconds   Peripheral Pulses: +2 palpable, equal bilaterally       Labs:   Recent Labs      11/03/17 2049 11/05/17 0407   WBC  7.0  5.4   HGB  11.9*  11.9*   HCT  36.0*  34.7*   PLT  119*  104*     Recent Labs      11/03/17 2049 11/05/17 0407   NA  139  141   K  4.9  4.1   CL  103  106   CO2  26  21*   BUN  29*  22   CREATININE  1.6*  1.3*   CALCIUM  8.4*  8.0*     Recent Labs      11/03/17 2049   AST  13   ALT  10*   BILITOT  0.5   ALKPHOS  83     No results for input(s): INR in the last 72 hours. No results for input(s): Fifi Wong in the last 72 hours. Urinalysis:      Lab Results   Component Value Date    NITRU NEGATIVE 12/12/2011    BACTERIA NONE 12/12/2011    BLOODU SMALL 12/12/2011    SPECGRAV 1.028 12/12/2011       Radiology:  CTA Head W WO Contrast   Final Result      1. 75 % stenosis at the origin of the right internal carotid artery. 2. No stenosis of the left carotid system. 3. Mild intracranial atherosclerosis without severe stenoses. 4. Severe stenosis at the origin of the right vertebral artery. There is moderate stenosis of the origin of the left vertebral artery. There is moderate stenosis of both vertebral arteries as they enter the dural ring. **This report has been created using voice recognition software. It may contain minor errors which are inherent in voice recognition technology. **      Final report electronically signed by Dr. Aurelio Kaplan on 11/3/2017 8:59 PM      CTA NECK W WO CONTRAST   Final Result      1. 75 % stenosis at the origin of the right internal carotid artery. 2. No stenosis of the left carotid system. 3. Mild intracranial atherosclerosis without severe stenoses. 4. Severe stenosis at the origin of the right vertebral artery.  There is moderate stenosis surgery given advanced dementia/age.  -medical mgt for suspected acute stroke c/w -antiplatelets,statin  -Pt was not a  Candidate for tPA on presentation- not given  -Swallow eval  -PT/OT/ST consults  -MRI brain pending  -palliative care consult with possible transition to hospice care        Electronically signed by Ameya Mcgee MD on 11/5/2017 at 2:53 PM

## 2017-11-06 VITALS
SYSTOLIC BLOOD PRESSURE: 168 MMHG | HEIGHT: 68 IN | DIASTOLIC BLOOD PRESSURE: 78 MMHG | BODY MASS INDEX: 25.16 KG/M2 | OXYGEN SATURATION: 96 % | WEIGHT: 166 LBS | RESPIRATION RATE: 16 BRPM | TEMPERATURE: 97.9 F | HEART RATE: 65 BPM

## 2017-11-06 PROBLEM — R47.01 RECEPTIVE APHASIA: Status: RESOLVED | Noted: 2017-11-04 | Resolved: 2017-11-06

## 2017-11-06 PROCEDURE — 92523 SPEECH SOUND LANG COMPREHEN: CPT

## 2017-11-06 PROCEDURE — 2580000003 HC RX 258: Performed by: INTERNAL MEDICINE

## 2017-11-06 PROCEDURE — 99239 HOSP IP/OBS DSCHRG MGMT >30: CPT | Performed by: NURSE PRACTITIONER

## 2017-11-06 RX ORDER — ATORVASTATIN CALCIUM 10 MG/1
10 TABLET, FILM COATED ORAL NIGHTLY
Qty: 30 TABLET | Refills: 3 | DISCHARGE
Start: 2017-11-06

## 2017-11-06 RX ADMIN — SODIUM CHLORIDE: 9 INJECTION, SOLUTION INTRAVENOUS at 12:28

## 2017-11-06 NOTE — DISCHARGE SUMMARY
Hospitalist Discharge Summary    Patient:  Alejandra Manuel  YOB: 1925    MRN: [id number]   Acct: [de-identified]    Primary Care Physician: Jose Wheeler MD    Admit date:  11/3/2017    Discharge date:  11/6/2017      Discharge Diagnoses:   1. Acute right hemisphere CVA--CTA neg for hemorrhage; no MRI; neuro following; no tPA/intervention; aphasia has resolved; unable to determine hemiparesis due to chronic weakness/contractures but able to move all 4 extremities; cont asa, statin; PT/OT/ST  2. Aphasia--related to #1; resolved; not sure of baseline but able to speak and answer simple questions  3. Alzheimer's dementia with behavioral disturbance--oriented to self and follows commands; cont Namenda  4. Dysphagia--related to #1; ST recommend  Pureed diet and thin liquids  5. Right internal carotid stenosis--75% per MRA neck; not candidate for surgical or percutaneous intervention  6. Bilateral vertebral stenosis--mod left and severe right per MRA  7. BPH--continue Flomax  8. Code status--changed to [facility] per family request; palliative care    Discharge Medications:     [facility] [place] Medication Instructions ASP:[id number]    Printed on:11/06/17 4487   Medication Information                      acetaminophen (TYLENOL) 325 MG tablet  Take 650 mg by mouth every 4 hours as needed for Pain or Fever              atorvastatin (LIPITOR) 10 MG tablet  Take 1 tablet by mouth nightly             BABY ASPIRIN PO  Take 81 mg by mouth daily.                donepezil (ARICEPT) 10 MG tablet    Take 10 mg by mouth daily              ipratropium-albuterol (DUONEB) 0.5-2.5 (3) MG/3ML SOLN nebulizer solution  Inhale 3 mLs into the lungs every 4 hours as needed for Shortness of Breath             loperamide (IMODIUM) 2 MG capsule  Take 2 mg by mouth as needed for Diarrhea              memantine (NAMENDA XR) 28 MG CP24 capsule  Take 28 mg by mouth daily             Multiple Vitamins-Minerals (THERAPEUTIC bilaterally. Basilar artery: There is mild irregularity suggestive atherosclerosis. There is no severe stenosis. Superior cerebellar arteries: Normal. Posterior cerebral arteries: Normal. The proximal branches are also normal. No aneurysms or occlusions are noted. The superior sagittal sinus, vein of Servando, internal cerebral veins, straight sinus, transverse sinuses and sigmoid sinuses are patent. Axial source data: The lung apices are clear. There is no upper mediastinal or cervical adenopathy. There are no gross abnormalities in the brain parenchyma. There is some mucosal thickening in the left maxillary sinus. The other paranasal sinuses are normally aerated. There are degenerative changes in the cervical spine. 1. 75 % stenosis at the origin of the right internal carotid artery. 2. No stenosis of the left carotid system. 3. Mild intracranial atherosclerosis without severe stenoses. 4. Severe stenosis at the origin of the right vertebral artery. There is moderate stenosis of the origin of the left vertebral artery. There is moderate stenosis of both vertebral arteries as they enter the dural ring. **This report has been created using voice recognition software. It may contain minor errors which are inherent in voice recognition technology. ** Final report electronically signed by Dr. Sean Marques on 11/3/2017 8:59 PM    Ct Head Wo Contrast    Result Date: 11/3/2017  PROCEDURE: CT HEAD WO CONTRAST CLINICAL INFORMATION: SPEECH CHANGES (OR APHASIA), NEW OR PROGRESSIVE, . Dementia. Speech changes. Aphasia. COMPARISON: Head CT 12/12/2011. TECHNIQUE: Noncontrast 5 mm axial images were obtained through the brain. All CT scans at this facility use dose modulation, iterative reconstruction, and/or weight-based dosing when appropriate to reduce radiation dose to as low as reasonably achievable. FINDINGS: There is global volume loss. There is no hemorrhage. There are no intra-or extra-axial collections.   The ventricles are moderately dilated secondary to volume loss. This is stable. There is a moderate to severe amount of abnormal low attenuation in the white matter of the brain consistent with chronic small vessel ischemic changes. This is unchanged. There is an old lacunar type infarct in the right thalamus. There are vascular calcifications. No new areas of abnormal attenuation are noted. There is some mild mucosal thickening in the left maxillary sinus. There is no suspicious calvarial abnormality. 1. Moderate-severe chronic small vessel ischemic changes. 2. No acute findings. **This report has been created using voice recognition software. It may contain minor errors which are inherent in voice recognition technology. ** Final report electronically signed by Dr. Bob Polanco on 11/3/2017 8:01 PM    Cta Neck W Wo Contrast    Result Date: 11/3/2017  PROCEDURE: CTA HEAD W WO CONTRAST, CTA NECK W WO CONTRAST CLINICAL INFORMATION: cva. Left-sided facial droop. Stopped talking around 7:00 PM. COMPARISON: Head CT 11/3/2017. TECHNIQUE: 1 mm axial images were obtained through the head and neck after the fast bolus administration of contrast. A noncontrast localizer was obtained. 3-D reconstructions were performed on a dedicated 3-D workstation. These include multiplanar MPR images and multiplanar MIP images. Centerline reconstructions were obtained of the carotid systems. Isovue intravenous contrast was given. All CT scans at this facility use dose modulation, iterative reconstruction, and/or weight-based dosing when appropriate to reduce radiation dose to as low as reasonably achievable. FINDINGS: CTA NECK: Aortic arch and branches: The aortic arch is within appropriate limits. The innominate artery and left common carotid artery have normal origins. There is no stenosis at the origin of the left subclavian artery. There is a mild stenosis distal to the origin. There is no stenosis of the right subclavian artery.  There is Axial source data: The lung apices are clear. There is no upper mediastinal or cervical adenopathy. There are no gross abnormalities in the brain parenchyma. There is some mucosal thickening in the left maxillary sinus. The other paranasal sinuses are normally aerated. There are degenerative changes in the cervical spine. 1. 75 % stenosis at the origin of the right internal carotid artery. 2. No stenosis of the left carotid system. 3. Mild intracranial atherosclerosis without severe stenoses. 4. Severe stenosis at the origin of the right vertebral artery. There is moderate stenosis of the origin of the left vertebral artery. There is moderate stenosis of both vertebral arteries as they enter the dural ring. **This report has been created using voice recognition software. It may contain minor errors which are inherent in voice recognition technology. ** Final report electronically signed by Dr. Nile Ramirez on 11/3/2017 8:59 PM    CBC:   Recent Labs      11/05/17 0407   WBC  5.4   HGB  11.9*   PLT  104*     BMP:  Recent Labs      11/05/17 0407   NA  141   K  4.1   CL  106   CO2  21*   BUN  22   CREATININE  1.3*   GLUCOSE  84     Calcium:  Recent Labs      11/05/17 0407   CALCIUM  8.0*     Ionized Calcium:No results for input(s): IONCA in the last 72 hours. Magnesium:No results for input(s): MG in the last 72 hours. Phosphorus:No results for input(s): PHOS in the last 72 hours. BNP:No results for input(s): BNP in the last 72 hours. Glucose:  Recent Labs      11/03/17   1955   POCGLU  106     HgbA1C: No results for input(s): LABA1C in the last 72 hours. INR: No results for input(s): INR in the last 72 hours.   Hepatic: Recent Labs      11/03/17 2049   ALKPHOS  83   ALT  10*   AST  13   PROT  6.7   BILITOT  0.5   LABALBU  3.4*     Amylase and Lipase:  Recent Labs      11/03/17 2049   LACTA  1.3     Lactic Acid:   Recent Labs      11/03/17 2049   LACTA  1.3     Troponin: No results for input(s): CKTOTAL, CKMB, TROPONINI in the last 72 hours. BNP: No results for input(s): BNP in the last 72 hours. Lipids: Recent Labs      11/04/17   0456   CHOL  154   TRIG  83   HDL  46   LDLCALC  91     ABGs: No results found for: PHART, PO2ART, XAO6IUC, ONA1JXA, BEART        PHYSICAL EXAM:  BP (!) 168/78   Pulse 65   Temp 97.9 °F (36.6 °C) (Axillary)   Resp 16   Ht 5' 8\" (1.727 m)   Wt 166 lb (75.3 kg)   SpO2 96%   BMI 25.24 kg/m²   General appearance: No apparent distress, appears stated age and cooperative. HEENT: Pupils equal, round. Yellow crust to eye lids/lashes. Neck: Supple, with full range of motion. No jugular venous distention. Trachea midline. Respiratory:  Normal respiratory effort. Clear to auscultation and diminished bilaterally without Rales/Wheezes/Rhonchi. Cardiovascular: Regular rate and rhythm with normal S1/S2 without murmurs, rubs or gallops. Abdomen: Soft, non-tender, non-distended with normal bowel sounds. Musculoskeletal:  No edema. Minimal movement to extremities x 4. Arms/hands contractures. Bilateral foot drop. Skin: Skin color, texture, turgor normal.  No rashes or lesions. Neurologic:  Neurovascularly intact without any focal sensory/motor deficits. Cranial nerves: II-XII intact, grossly non-focal.  Psychiatric: Alert and oriented to self, follows simple commands and answers \"yes/no\" questions. Capillary Refill: Brisk,< 3 seconds   Peripheral Pulses: +1 palpable, equal bilaterally      Weight: Weight: 166 lb (75.3 kg)     24 hour intake/output:  Intake/Output Summary (Last 24 hours) at 11/06/17 1519  Last data filed at 11/06/17 0414   Gross per 24 hour   Intake          1800.87 ml   Output                0 ml   Net          1800.87 ml                 Hospital Course:  Mr. Elle Scott was admitted on 11/4/2017 after developing expressive aphasia according to his family. He has a history of Alzheimer's dementia and is bedridden with contractures to all 4 extremities.  Neurology was consulted. He was not a candidate for tPA or percutaneous intervention. He was already taking aspirin and a statin. His aphasia resolved spontaneously. Palliative care was consulted and discussed code status. He code status was changed to [facility]. His condition stabilized and he was transferred to [facility] per [facility]. Medication reconciliation was completed. Plan discussed with Dr. Daniel Dominguez.     Disposition: long term care facility    Condition: Stable    Time Spent: 30 to 74 minutes        Electronically signed by Germania Vaughn CNP on 11/6/2017 at 3:19 PM  Discharging Hospitalist: Dr. Daniel Dominguez

## 2017-11-06 NOTE — PROGRESS NOTES
Pharmacy Medication History Note      List of current medications patient is taking is complete. Source of information: [facility]    Changes made to medication list:    Medications removed (include reason, ex. therapy complete or physician discontinued):  · None    Medications added/doses adjusted:   Loperamide changed to prn    Other notes (ex. Recent course of antibiotics, Coumadin dosing):  · Denies use of other OTC or herbal medications. Allergies reviewed      Electronically signed by Se Meredith.  Bryan Stout, [address] [place] [address] on 11/6/2017 at 12:17 PM

## 2017-11-06 NOTE — CARE COORDINATION
Primary RN, Leila Corbin to get Medicare Rights signed by Fadi Pérez when he arrives this afternoon.  Electronically signed by Susanne Monroe RN on 11/6/17 at 10:24 AM

## 2017-11-06 NOTE — CARE COORDINATION
11/6/17, 2:54 PM    DISCHARGE BARRIERS  Full assessment deferred. Patient is from [facility] and will be returning to the facility at discharge. Spoke with Yola Ware at [facility] and she verified patient can return at discharge. [facility] will be set up for transport. Family is agreeable to discharge plan.

## 2017-11-06 NOTE — CARE COORDINATION
Patient's Isabel Sandhu, present to visit with him. Medicare Rights reviewed with him, signature obtained. [facility] states the plan is for Sapna Covarrubias to return to [facility]. Palliative Care to meet with [facility] Azar to determine goals of care.  Electronically signed by Maria E Camarena RN on 11/6/17 at 2:20 PM

## 2017-11-06 NOTE — PROGRESS NOTES
Hospitalist Progress Note    Patient:  Asmita Noguera Person      Unit/Bed:4A-16/016-A    YOB: 1925    MRN: [id number]       Acct: [de-identified]     PCP: Jyoti Carballo MD    Date of Admission: 11/3/2017    Chief Complaint:  Expressive aphasia    Hospital Course:  Admitted with above. He has a history of Alzheimer's dementia. He also has weakness and bilateral upper extremity contractures. MRA head and neck revealed 75% JASPER and bilateral vertebral stenosis. Due to his age and functional status, he was not a candidate for tPA or carotid angiography/stent per Dr. Manjeet Davies. ST/PT/OT working with patient. Subjective: In bed with eyes closed. Patient does open eyes to verbal command. He also answers yes no questions and moves extremities to command. He denies pain, SOB and nausea. Discussed POC with RN. No family present. Medications:  Reviewed    Infusion Medications    sodium chloride 75 mL/hr at 11/04/17 0715     Scheduled Medications    aspirin  81 mg Oral Daily    atorvastatin  10 mg Oral Nightly    donepezil  10 mg Oral Daily    tamsulosin  0.4 mg Oral Daily    therapeutic multivitamin-minerals  1 tablet Oral Daily    influenza virus vaccine  0.5 mL Intramuscular Once    pneumococcal 13-valent conjugate  0.5 mL Intramuscular Once    memantine  10 mg Oral BID     PRN Meds: acetaminophen, nitroGLYCERIN, ondansetron, morphine **OR** [DISCONTINUED] morphine      Intake/Output Summary (Last 24 hours) at 11/06/17 0916  Last data filed at 11/06/17 0414   Gross per 24 hour   Intake          1860.87 ml   Output                0 ml   Net          1860.87 ml       Diet:  DIET DYSPHAGIA I PUREED; Exam:  BP (!) 160/73   Pulse 62   Temp 97.2 °F (36.2 °C) (Axillary)   Resp 18   Ht 5' 8\" (1.727 m)   Wt 166 lb (75.3 kg)   SpO2 95%   BMI 25.24 kg/m²     General appearance: No apparent distress, appears stated age and cooperative. HEENT: Pupils equal, round.  Yellow crust to eye is moderate stenosis of both vertebral arteries as they enter the dural ring. **This report has been created using voice recognition software. It may contain minor errors which are inherent in voice recognition technology. **      Final report electronically signed by Dr. Blaine Ruiz on 11/3/2017 8:59 PM      CTA NECK W WO CONTRAST   Final Result      1. 75 % stenosis at the origin of the right internal carotid artery. 2. No stenosis of the left carotid system. 3. Mild intracranial atherosclerosis without severe stenoses. 4. Severe stenosis at the origin of the right vertebral artery. There is moderate stenosis of the origin of the left vertebral artery. There is moderate stenosis of both vertebral arteries as they enter the dural ring. **This report has been created using voice recognition software. It may contain minor errors which are inherent in voice recognition technology. **      Final report electronically signed by Dr. Blaine Ruiz on 11/3/2017 8:59 PM      CT HEAD WO CONTRAST   Final Result       1. Moderate-severe chronic small vessel ischemic changes. 2. No acute findings. **This report has been created using voice recognition software. It may contain minor errors which are inherent in voice recognition technology. **      Final report electronically signed by Dr. Blaine Ruiz on 11/3/2017 8:01 PM      MRI BRAIN WO CONTRAST    (Results Pending)       Diet: DIET DYSPHAGIA I PUREED;    DVT prophylaxis: [] Lovenox                                 [x] SCDs                                 [] SQ Heparin                                 [] Encourage ambulation           [] Already on Anticoagulation     Disposition:    [x] Home       [] TCU       [] Rehab       [] Psych       [] SNF       [] [facility]       [] Other-    Code Status: DNR-CCA    PT/OT/ST Eval Status: current    Assessment/Plan:    Anticipated Discharge in : 1-2 days    BELL/Kizzy Weeks 9385 Problems    Diagnosis Date Noted    Stenosis of right carotid artery  severe  75% [I65.21] 11/04/2017     Priority: High     Class: Acute    Vertebral artery stenosis  severe right  moderate left [I72.6] 11/04/2017     Priority: High     Class: Acute    Receptive aphasia [R47.01] 11/04/2017     Priority: High     Class: Acute    Aphasia [R47.01]     Benign essential HTN [I10]     Late onset Alzheimer's disease with behavioral disturbance [G30.1, F02.81]        1. Acute right hemisphere CVA--CTA neg for hemorrhage; no MRI; neuro following; no tPA/intervention; aphasia has resolved; unable to determine hemiparesis due to chronic weakness/contractures but able to move all 4 extremities; cont asa, statin; PT/OT/ST  2. Aphasia--related to #1; resolved; not sure of baseline but able to speak and answer simple questions  3. Alzheimer's dementia with behavioral disturbance--oriented to self and follows commands; cont Namenda  4. Dysphagia--related to #1;   5. Right internal carotid stenosis--75% per MRA neck; not candidate for surgical or percutaneous intervention  6. Bilateral vertebral stenosis--mod left and severe right per MRA  7.  BPH--continue Flomax    Dispo: cont current treatment; SS to assist with discharge planning-either back home with family or SNF    Electronically signed by Flaquito Vaughn NP on 11/6/2017 at 9:16 AM

## 2017-11-06 NOTE — PROGRESS NOTES
THis RN received a call from [address] [place] [address] stating that [facility] would be here within 10 minutes to pick him up. This RN then called [facility] and updated them.

## 2017-11-06 NOTE — PROGRESS NOTES
CLINICAL PHARMACY: DISCHARGE MED RECONCILIATION/REVIEW    [facility]) Select Patient?: Yes  Total # of Interventions Recommended: 0   -   Total # Interventions Accepted: 0  Intervention Severity:   - Level 1 Intervention Present?: No   - Level 2 #: 0   - Level 3 #: 0   Time Spent (min): 5    Additional Documentation:

## 2017-11-06 NOTE — PROGRESS NOTES
s/s of aspiraiton in order to safley maintain adequate hydration and nutrition. Goal 2: Patient will complete advanced textures without s/s of aspiraiton in order to safely determine safety of diet upgrade. Goal 3: Pt will complete basic safety awareness and safety tasks with 70% accuracy to ensure safety and reduce fall risk in hospital setting.     Goal 4: Pt will complete basic naming tasks with 70% accuracy to improve communcation of wants and needs  Goal 5: Pt will follow 1 step commands and answer basci yes/no questions to improve communication of wants and needs      Wanda Otoole M.S. [id number]

## 2017-11-06 NOTE — PLAN OF CARE
Problem: Falls - Risk of  Goal: Absence of falls  Outcome: Ongoing  Patient on fall precautions. Falling star magnet on door. Bed in lowest position. Fall band intact. Call light within reach at all times. Patient educated to not get up per self to reduce falls.     Problem: Risk for Impaired Skin Integrity  Goal: Tissue integrity - skin and mucous membranes  Structural intactness and normal physiological function of skin and  mucous membranes. Outcome: Ongoing  Skin integrity intact. Patient turned frequently to reduce pressure sores.     Problem: HEMODYNAMIC STATUS  Goal: Patient has stable vital signs and fluid balance  Outcome: Ongoing        11/05/17 2039   Vital Signs   Temp 97.8 °F (36.6 °C)   Temp Source Axillary   Pulse 61   Heart Rate Source Monitor   Resp 16   BP (!) 165/75   BP Location Left Arm   BP Upper/Lower Upper   MAP (mmHg) 108   Patient Position Semi fowlers   Level of Consciousness 0   MEWS Score 1   Patient Currently in Pain Unable to Assess   Oxygen Therapy   SpO2 96 %   Pulse Oximeter Device Mode Intermittent   Pulse Oximeter Device Location Finger   O2 Device None (Room air)        Comments: Care plan reviewed with patient. Patient unable to verbalize understanding of the plan of care nor contribute to goal setting.

## 2017-11-07 LAB
MRSA SCREEN: ABNORMAL
ORGANISM: ABNORMAL

## 2017-11-08 LAB — VRE CULTURE: NORMAL
